# Patient Record
Sex: FEMALE
[De-identification: names, ages, dates, MRNs, and addresses within clinical notes are randomized per-mention and may not be internally consistent; named-entity substitution may affect disease eponyms.]

---

## 2023-03-15 ENCOUNTER — APPOINTMENT (OUTPATIENT)
Dept: ORTHOPEDIC SURGERY | Facility: CLINIC | Age: 78
End: 2023-03-15
Payer: MEDICARE

## 2023-03-15 VITALS — BODY MASS INDEX: 22.86 KG/M2 | HEIGHT: 63 IN | WEIGHT: 129 LBS

## 2023-03-15 DIAGNOSIS — Z78.9 OTHER SPECIFIED HEALTH STATUS: ICD-10-CM

## 2023-03-15 PROCEDURE — 73630 X-RAY EXAM OF FOOT: CPT | Mod: LT

## 2023-03-15 PROCEDURE — 99204 OFFICE O/P NEW MOD 45 MIN: CPT

## 2023-03-15 RX ORDER — MELOXICAM 15 MG/1
15 TABLET ORAL DAILY
Qty: 30 | Refills: 2 | Status: COMPLETED | COMMUNITY
Start: 2023-03-15 | End: 2023-06-13

## 2023-03-15 NOTE — ASSESSMENT
[FreeTextEntry1] : Patient has a mild 3rd mtp joint synovitis.\par Budin splint provided.\par Mobic prescribed.\par Icing daily\par Low impact activities.\par Supportive sneakers.\par \par The patient was explained the options as well as benefits of over the counter verses prescription strength nonsteroidal anti-inflammatory medication. The patient opts for a prescription strength medication.\par

## 2023-03-15 NOTE — HISTORY OF PRESENT ILLNESS
[7] : 7 [6] : 6 [Dull/Aching] : dull/aching [de-identified] : Pt is a 77 year old female presents for evaluation of her left foot.  She reports injuring it in late January.  She saw a podiatrist who said she sprained her toes and taping them were recommended..  She was told that her 2nd and 3rd toes "were drifting in".  She reports doing better overall, but still has some pain at the base of the 3rd toe.  She has been wearing a compression sleeve.  No other treatment. [] : Post Surgical Visit: no [FreeTextEntry1] : L foot [FreeTextEntry5] : Lorrie is a 77 year F. Stated they were at the gym 6 months ago they felt a pain. Stated they went to a pediatrist and the Dr taped it too tight. Foot became black and blue.

## 2023-03-15 NOTE — PHYSICAL EXAM
[NL (40)] : plantar flexion 40 degrees [NL 30)] : inversion 30 degrees [NL (20)] : eversion 20 degrees [2+] : posterior tibialis pulse: 2+ [Normal] : saphenous nerve sensation normal [3rd] : 3rd [5___] : [facility] 5[unfilled]/5 [] : non-antalgic [Left] : left foot [Weight -] : weightbearing [There are no fractures, subluxations or dislocations. No significant abnormalities are seen] : There are no fractures, subluxations or dislocations. No significant abnormalities are seen [FreeTextEntry3] : Mild deviation of 2nd and 3rd toes (same in both feet). Mild swelling at 3rd mtp joint. [de-identified] : Normal lesser toe flexor/extensor tendon function. [de-identified] : Mild medial deviation at  2nd and 3rd MTP joints.

## 2023-04-05 ENCOUNTER — APPOINTMENT (OUTPATIENT)
Dept: ORTHOPEDIC SURGERY | Facility: CLINIC | Age: 78
End: 2023-04-05
Payer: MEDICARE

## 2023-04-05 DIAGNOSIS — M65.9 SYNOVITIS AND TENOSYNOVITIS, UNSPECIFIED: ICD-10-CM

## 2023-04-05 PROCEDURE — 99213 OFFICE O/P EST LOW 20 MIN: CPT

## 2023-04-05 NOTE — HISTORY OF PRESENT ILLNESS
[7] : 7 [6] : 6 [Dull/Aching] : dull/aching [0] : 0 [FreeTextEntry6] : none [de-identified] : Pt is a 77 year old female returns for f/u of her left foot.  She reports injuring it in late January, seen by  podiatrist who said she sprained her toes and taping them were recommended.  She was told that her 2nd and 3rd toes "were drifting in".  She reports doing better overall, but still has some pain at the base of the 3rd toe.  She has been wearing a compression sleeve.  No other treatment. [] : Post Surgical Visit: no [FreeTextEntry1] : L foot [FreeTextEntry5] : Lorrie is a 77 year F. Stated they were at the gym 6 months ago they felt a pain. Stated they went to a pediatrist and the Dr taped it too tight. Foot became black and blue.

## 2023-04-05 NOTE — PHYSICAL EXAM
[3rd] : 3rd [NL (40)] : plantar flexion 40 degrees [NL 30)] : inversion 30 degrees [NL (20)] : eversion 20 degrees [5___] : [facility] 5[unfilled]/5 [2+] : posterior tibialis pulse: 2+ [Normal] : saphenous nerve sensation normal [Left] : left foot [Weight -] : weightbearing [There are no fractures, subluxations or dislocations. No significant abnormalities are seen] : There are no fractures, subluxations or dislocations. No significant abnormalities are seen [] : non-antalgic [FreeTextEntry3] : Mild deviation of 2nd and 3rd toes (same in both feet). Mild swelling at 3rd mtp joint. [de-identified] : Normal lesser toe flexor/extensor tendon function. [de-identified] : Mild medial deviation at  2nd and 3rd MTP joints.

## 2023-04-05 NOTE — HISTORY OF PRESENT ILLNESS
[7] : 7 [6] : 6 [Dull/Aching] : dull/aching [0] : 0 [FreeTextEntry6] : none [de-identified] : Pt is a 77 year old female returns for f/u of her left foot.  She reports injuring it in late January, seen by  podiatrist who said she sprained her toes and taping them were recommended.  She was told that her 2nd and 3rd toes "were drifting in".  She reports doing better overall, but still has some pain at the base of the 3rd toe.  She has been wearing a compression sleeve.  No other treatment. [] : Post Surgical Visit: no [FreeTextEntry1] : L foot [FreeTextEntry5] : Lorrie is a 77 year F. Stated they were at the gym 6 months ago they felt a pain. Stated they went to a pediatrist and the Dr taped it too tight. Foot became black and blue.

## 2023-04-05 NOTE — PHYSICAL EXAM
[3rd] : 3rd [NL (40)] : plantar flexion 40 degrees [NL 30)] : inversion 30 degrees [NL (20)] : eversion 20 degrees [5___] : [facility] 5[unfilled]/5 [2+] : posterior tibialis pulse: 2+ [Normal] : saphenous nerve sensation normal [Left] : left foot [Weight -] : weightbearing [There are no fractures, subluxations or dislocations. No significant abnormalities are seen] : There are no fractures, subluxations or dislocations. No significant abnormalities are seen [] : non-antalgic [FreeTextEntry3] : Mild deviation of 2nd and 3rd toes (same in both feet). Mild swelling at 3rd mtp joint. [de-identified] : Normal lesser toe flexor/extensor tendon function. [de-identified] : Mild medial deviation at  2nd and 3rd MTP joints.

## 2023-08-30 RX ORDER — METAXALONE 400 MG/1
400 TABLET ORAL 3 TIMES DAILY
Qty: 60 | Refills: 0 | Status: ACTIVE | COMMUNITY
Start: 2023-08-30 | End: 1900-01-01

## 2023-09-20 ENCOUNTER — APPOINTMENT (OUTPATIENT)
Dept: ORTHOPEDIC SURGERY | Facility: CLINIC | Age: 78
End: 2023-09-20
Payer: MEDICARE

## 2023-09-20 VITALS — BODY MASS INDEX: 22.68 KG/M2 | WEIGHT: 128 LBS | HEIGHT: 63 IN

## 2023-09-20 DIAGNOSIS — M17.11 UNILATERAL PRIMARY OSTEOARTHRITIS, RIGHT KNEE: ICD-10-CM

## 2023-09-20 PROCEDURE — 73564 X-RAY EXAM KNEE 4 OR MORE: CPT | Mod: 50

## 2023-09-20 PROCEDURE — 99213 OFFICE O/P EST LOW 20 MIN: CPT | Mod: 25

## 2023-09-20 PROCEDURE — 20610 DRAIN/INJ JOINT/BURSA W/O US: CPT | Mod: RT
